# Patient Record
Sex: FEMALE | Race: WHITE | Employment: OTHER | URBAN - METROPOLITAN AREA
[De-identification: names, ages, dates, MRNs, and addresses within clinical notes are randomized per-mention and may not be internally consistent; named-entity substitution may affect disease eponyms.]

---

## 2017-03-10 ENCOUNTER — HOSPITAL ENCOUNTER (OUTPATIENT)
Age: 77
Discharge: HOME | End: 2017-03-10
Payer: MEDICARE

## 2017-03-10 ENCOUNTER — APPOINTMENT (OUTPATIENT)
Dept: GENERAL RADIOLOGY | Age: 77
End: 2017-03-10
Attending: PHYSICIAN ASSISTANT
Payer: MEDICARE

## 2017-03-10 VITALS
BODY MASS INDEX: 23.05 KG/M2 | DIASTOLIC BLOOD PRESSURE: 86 MMHG | HEART RATE: 78 BPM | SYSTOLIC BLOOD PRESSURE: 142 MMHG | TEMPERATURE: 98 F | HEIGHT: 64 IN | OXYGEN SATURATION: 99 % | WEIGHT: 135 LBS | RESPIRATION RATE: 20 BRPM

## 2017-03-10 DIAGNOSIS — S63.636A SPRAIN OF INTERPHALANGEAL JOINT OF RIGHT LITTLE FINGER, INITIAL ENCOUNTER: ICD-10-CM

## 2017-03-10 DIAGNOSIS — S01.111A EYEBROW LACERATION, RIGHT, INITIAL ENCOUNTER: Primary | ICD-10-CM

## 2017-03-10 PROCEDURE — 99213 OFFICE O/P EST LOW 20 MIN: CPT

## 2017-03-10 PROCEDURE — 12011 RPR F/E/E/N/L/M 2.5 CM/<: CPT

## 2017-03-10 PROCEDURE — 29130 APPL FINGER SPLINT STATIC: CPT

## 2017-03-10 RX ORDER — CLOPIDOGREL BISULFATE 75 MG/1
75 TABLET ORAL DAILY
COMMUNITY
End: 2018-07-27

## 2017-03-10 RX ORDER — LISINOPRIL 20 MG/1
20 TABLET ORAL DAILY
COMMUNITY
End: 2018-07-27

## 2017-03-10 RX ORDER — PANTOPRAZOLE SODIUM 40 MG/1
40 TABLET, DELAYED RELEASE ORAL
COMMUNITY
End: 2021-04-29

## 2017-03-10 NOTE — ED INITIAL ASSESSMENT (HPI)
10 am today - jumped up from chair, turned to right to get away from a bug on a table. Her feet did not separate, became tangled and caused her to fall bumping right eyebrow on table and injuring right 4th finger. Ecchymosis and pain especially DIP.   Smal

## 2017-03-10 NOTE — ED PROVIDER NOTES
Patient Seen in: [facility]    History   Patient presents with:  Laceration Abrasion (integumentary)  Finger Injury    Stated Complaint: LAC OVER RT EYE/FELL AT HOME    HPI    Patient is a pleasant 50-year-old female.   Patient i COLONOSCOPY  2010, 2012    MAMMOGRAM, SCREENING, BILATERAL (CPT=77057)  5/1/2013    HEMORRHOIDECTOMY  2011    TONSILLECTOMY  1949    D & C  1970    [facility] [place] INCISION AND DRAINAGE PERIRECTAL ABSCESS  6/6/2011    UPPER GI ENDOSCOPY,BIOPSY  1/31/2013    COLONOSC Conjunctiva WNL. No obvious discharge or crusting  ENT: No dyer sign, raccoon sign or hemotympanum  Neck: Supple, No appreciable Lymphadenopathy or thyromegaly, mass or swelling. No cervical point tenderness.    Lymph: No axillary, cervical, supraclavicul Patient's visiting home nurse was also informed to watch for changes. At this time, she has no complaints and her cranial nerves are intact. A short finger splint was applied to the right ring finger. Wear as needed.   Primary care follow-up in 5-7 days

## 2017-03-16 ENCOUNTER — HOSPITAL ENCOUNTER (OUTPATIENT)
Age: 77
Discharge: HOME | End: 2017-03-16
Payer: MEDICARE

## 2017-03-16 ENCOUNTER — APPOINTMENT (OUTPATIENT)
Dept: GENERAL RADIOLOGY | Age: 77
End: 2017-03-16
Attending: PHYSICIAN ASSISTANT
Payer: MEDICARE

## 2017-03-16 VITALS
TEMPERATURE: 98 F | DIASTOLIC BLOOD PRESSURE: 80 MMHG | RESPIRATION RATE: 16 BRPM | HEART RATE: 73 BPM | OXYGEN SATURATION: 100 % | SYSTOLIC BLOOD PRESSURE: 151 MMHG

## 2017-03-16 DIAGNOSIS — Z48.02 ENCOUNTER FOR REMOVAL OF SUTURES: Primary | ICD-10-CM

## 2017-03-16 DIAGNOSIS — S63.634D SPRAIN OF INTERPHALANGEAL JOINT OF RIGHT RING FINGER, SUBSEQUENT ENCOUNTER: ICD-10-CM

## 2017-03-16 PROCEDURE — 99213 OFFICE O/P EST LOW 20 MIN: CPT

## 2017-03-16 PROCEDURE — 73140 X-RAY EXAM OF FINGER(S): CPT

## 2017-03-16 NOTE — ED PROVIDER NOTES
Patient Seen in: [facility]    History   Patient presents with:  Upper Extremity Injury (musculoskeletal): right 4 th finger   Sut Stap RingRemoval (ingtegumentary): right eye     Stated Complaint: suture removal    HPI    CHIEF • Mitral valve prolapse    • Rectal bleeding      w/bowel movements   • Rectal polyp 10/19/2010     on colonoscopy-tubular adenoma   • Stroke [facility])            Past Surgical History    UPPER GI ENDOSCOPY,BIOPSY  1/13/13    Comment performed by Dr Loraw/CARLA Current:/80 mmHg  Pulse 73  Temp(Src) 97.6 °F (36.4 °C) (Oral)  Resp 16  SpO2 100%        Physical Exam   Constitutional: She is oriented to person, place, and time. She appears well-developed and well-nourished.    HENT:   Head:       Musculoskeletal Discharge    Follow-up:  Mike Zheng, [address] [facility] [phone]    In 1 week  For reevaluation      Medications Prescribed:  Current Discharge Medication List

## 2017-03-16 NOTE — ED INITIAL ASSESSMENT (HPI)
Patient presents to the immediate care for suture removal of sutures placed at the immediate care last Friday and a recheck of pain to her right 4th finger post fall. Patient states that last Friday she fell and evaluated at the immediate care.  Patient brett

## 2017-03-16 NOTE — ED NOTES
Received report. Spoke with pt. Aware we are waiting for xray read. Pt eager to leave has care giver with her who needs to leave.

## 2017-03-20 ENCOUNTER — TELEPHONE (OUTPATIENT)
Dept: INTERNAL MEDICINE CLINIC | Facility: CLINIC | Age: 77
End: 2017-03-20

## 2017-03-20 NOTE — TELEPHONE ENCOUNTER
Pt seen in immediate care and asked to follow up with us. There is already a pcp on file. Please reach out to pt to see if she intends to follow up at our clinic. If so please schedule appt.

## 2017-07-20 ENCOUNTER — OFFICE VISIT (OUTPATIENT)
Dept: SURGERY | Facility: CLINIC | Age: 77
End: 2017-07-20

## 2017-07-20 VITALS
SYSTOLIC BLOOD PRESSURE: 153 MMHG | BODY MASS INDEX: 21 KG/M2 | WEIGHT: 123 LBS | DIASTOLIC BLOOD PRESSURE: 77 MMHG | HEART RATE: 78 BPM

## 2017-07-20 DIAGNOSIS — K57.30 DIVERTICULOSIS OF LARGE INTESTINE WITHOUT HEMORRHAGE: ICD-10-CM

## 2017-07-20 DIAGNOSIS — D12.9 BENIGN NEOPLASM OF RECTUM AND ANAL CANAL: ICD-10-CM

## 2017-07-20 DIAGNOSIS — D12.8 BENIGN NEOPLASM OF RECTUM AND ANAL CANAL: ICD-10-CM

## 2017-07-20 DIAGNOSIS — D37.5 VILLOUS ADENOMA OF RECTUM: Primary | ICD-10-CM

## 2017-07-20 DIAGNOSIS — K63.5 BENIGN COLON POLYP: ICD-10-CM

## 2017-07-20 DIAGNOSIS — K64.8 HEMORRHOIDS, INTERNAL, WITH BLEEDING: ICD-10-CM

## 2017-07-20 DIAGNOSIS — K59.02 CONSTIPATION, OUTLET DYSFUNCTION: ICD-10-CM

## 2017-07-20 PROCEDURE — 99213 OFFICE O/P EST LOW 20 MIN: CPT | Performed by: COLON & RECTAL SURGERY

## 2017-07-20 PROCEDURE — 46600 DIAGNOSTIC ANOSCOPY SPX: CPT | Performed by: COLON & RECTAL SURGERY

## 2017-07-20 RX ORDER — DOCUSATE SODIUM 100 MG/1
100 CAPSULE, LIQUID FILLED ORAL 2 TIMES DAILY
COMMUNITY

## 2017-07-20 RX ORDER — QUETIAPINE 50 MG/1
TABLET, FILM COATED ORAL
Refills: 1 | COMMUNITY
Start: 2017-07-07

## 2017-07-20 NOTE — PROGRESS NOTES
Follow Up Visit Note       Active Problems      1. Villous adenoma of rectum    2. Benign colon polyp    3. Benign neoplasm of rectum and anal canal    4. Constipation, outlet dysfunction    5. Diverticulosis of large intestine without hemorrhage    6.  Hem female    • Stroke [facility])      Past Surgical History:  2010, 2012: COLONOSCOPY  No date: COLONOSCOPY  1970: D & C  6/6/2011: [facility] INCISION AND DRAINAGE PERIRECTAL ABSCESS  2011: HEMORRHOIDECTOMY  5/1/2013: MAMMOGRAM, SCREENING, BILATERAL (CPT=77057)  1949: diaphoresis, fatigue, fever and unexpected weight change. HENT: Negative for hearing loss, nosebleeds, sore throat and trouble swallowing. Respiratory: Negative for apnea, cough, shortness of breath and wheezing.     Cardiovascular: Negative for chest note and vitals reviewed.     Assessment     Assessment   Villous adenoma of rectum  (primary encounter diagnosis)  Benign colon polyp  Benign neoplasm of rectum and anal canal  Constipation, outlet dysfunction  Diverticulosis of large intestine without hem large left lateral internal hemorrhoid will require no therapy. No orders of the defined types were placed in this encounter. Imaging & Referrals   None    Follow Up  Return in about 1 year (around 7/20/2018).     Hernan Steele MD

## 2017-07-28 NOTE — PROCEDURES
Procedure:  Anoscopy    Surgeon: Rinku Mazariegos    Anesthesia: None    Findings: See the progress note attached for all findings    Operative Summary: The patient was placed in a prone position on the proctoscopy table, the hips were flexed in the jackknife p

## 2017-07-28 NOTE — PATIENT INSTRUCTIONS
This patient presents for further care treatment of her villous adenoma of the rectum. She also has constipation by outlet obstruction. The villous tumor of the rectum had definitive resection performed on October 20, 2010.     The patient feels a smal

## 2017-08-29 ENCOUNTER — OFFICE VISIT (OUTPATIENT)
Dept: SURGERY | Facility: CLINIC | Age: 77
End: 2017-08-29

## 2017-08-29 VITALS
DIASTOLIC BLOOD PRESSURE: 80 MMHG | HEIGHT: 64 IN | BODY MASS INDEX: 21 KG/M2 | SYSTOLIC BLOOD PRESSURE: 130 MMHG | WEIGHT: 123 LBS | TEMPERATURE: 98 F

## 2017-08-29 DIAGNOSIS — D37.5 VILLOUS ADENOMA OF RECTUM: ICD-10-CM

## 2017-08-29 DIAGNOSIS — K59.02 CONSTIPATION, OUTLET DYSFUNCTION: Primary | ICD-10-CM

## 2017-08-29 PROCEDURE — 99213 OFFICE O/P EST LOW 20 MIN: CPT | Performed by: COLON & RECTAL SURGERY

## 2017-08-29 NOTE — PROGRESS NOTES
Follow Up Visit Note       Active Problems      1. Constipation, outlet dysfunction    2. Villous adenoma of rectum          Chief Complaint   Patient presents with:  Anal Problem (GI): Constipation being treated with miralax, colace and benefiber. Has a lo current treatment team.  I did tell her that taking one Seroquel and 2 Xanax at the same time could cause her to be extremely drowsy and lethargic. She notes an incident where she was [place] a zombie\".         My total face time with this patient was 39 m Mother    • Cancer Brother      Social History    Marital status:              Spouse name:                       Years of education:                 Number of children:               Social History Main Topics    Smoking status: Never Smoker Skin: Negative for color change and rash. Neurological: Negative for tremors, syncope and weakness. Hematological: Negative for adenopathy. Does not bruise/bleed easily.    Psychiatric/Behavioral: Negative for behavioral problems and sleep disturbance umbilical hernias present. The liver and spleen are not palpable. She has no guarding or rebound. I have asked her to maintain the above listed schedule. She asked me several questions at this visit.     I told her that gas is normal, and can be ant

## 2017-08-29 NOTE — PATIENT INSTRUCTIONS
I am seeing this patient in further consultation regarding irregular bowel habits and constipation. The patient states she is greatly improved from her last visit.     At her last visit we discussed the fact that her recent stroke, and current medication and Xanax. I have deferred any decision making to her current treatment team.  I did tell her that taking one Seroquel and 2 Xanax at the same time could cause her to be extremely drowsy and lethargic.   She notes an incident where [facility] [place] [facility]

## 2017-10-30 ENCOUNTER — TELEPHONE (OUTPATIENT)
Dept: SURGERY | Facility: CLINIC | Age: 77
End: 2017-10-30

## 2017-10-30 NOTE — TELEPHONE ENCOUNTER
Pt seen in Aug and put on a bowel regime of miralax, colace, & benefiber. Pt has been doing well and then today had 5 BM and there was some blood.  Pt also saw another doctor and they told her that her constipation is due to her stroke although these meds h

## 2018-02-13 ENCOUNTER — TELEPHONE (OUTPATIENT)
Dept: SURGERY | Facility: CLINIC | Age: 78
End: 2018-02-13

## 2018-02-13 NOTE — TELEPHONE ENCOUNTER
Patient expressed concern that when she has a bowel movement 1-2 times per day she passes urine. This started approx 6 months ago. Patient is following the same bowel regime that Dr. Letty Mckenna ordered.  She has an appointment with Dr. Letty Mckenna in August for foll

## 2018-07-24 ENCOUNTER — OFFICE VISIT (OUTPATIENT)
Dept: SURGERY | Facility: CLINIC | Age: 78
End: 2018-07-24
Payer: MEDICARE

## 2018-07-24 VITALS
BODY MASS INDEX: 21 KG/M2 | DIASTOLIC BLOOD PRESSURE: 76 MMHG | WEIGHT: 123 LBS | HEART RATE: 75 BPM | SYSTOLIC BLOOD PRESSURE: 144 MMHG | TEMPERATURE: 98 F

## 2018-07-24 DIAGNOSIS — D37.5 VILLOUS ADENOMA OF RECTUM: Primary | ICD-10-CM

## 2018-07-24 DIAGNOSIS — N81.6 RECTOCELE: ICD-10-CM

## 2018-07-24 DIAGNOSIS — F41.9 ANXIETY: ICD-10-CM

## 2018-07-24 DIAGNOSIS — K59.02 CONSTIPATION, OUTLET DYSFUNCTION: ICD-10-CM

## 2018-07-24 DIAGNOSIS — K58.1 IRRITABLE BOWEL SYNDROME WITH CONSTIPATION: ICD-10-CM

## 2018-07-24 PROCEDURE — 46600 DIAGNOSTIC ANOSCOPY SPX: CPT | Performed by: COLON & RECTAL SURGERY

## 2018-07-24 PROCEDURE — 99214 OFFICE O/P EST MOD 30 MIN: CPT | Performed by: COLON & RECTAL SURGERY

## 2018-07-24 NOTE — PROGRESS NOTES
Follow Up Visit Note       Active Problems      1. Villous adenoma of rectum    2. Irritable bowel syndrome with constipation    3. Rectocele    4. Constipation, outlet dysfunction    5.  Anxiety          Chief Complaint   Patient presents with:  Anal Probl as scribed by the PA  I performed my own physical exam and obtained the history as detailed above.   I have made all appropriate changes in documentation as necessary  I attest to the accuracy of this note as detailed above    Esperanza Lynn MD FACS [facility] education:                 Number of children:               Social History Main Topics    Smoking status: Never Smoker                                                                Alcohol use: Yes                Comment: social    Drug use:  No myalgias. Skin: Negative for color change and rash. Neurological: Negative for tremors, syncope and weakness. Hematological: Negative for adenopathy. Does not bruise/bleed easily.    Psychiatric/Behavioral: Negative for behavioral problems and sleep d dysfunction  Anxiety    Plan   This patient presents for continued evaluation and treatment regarding irregular bowel habits and constipation. The patient also has a history of a villous adenoma of the rectum.     The patient states that she is doing well office visit discussed with the patient and her granddaughter that I would recommend she continue her current bowel regimen with very mild changes. We will have her take her Benefiber once daily at 9 AM in the morning.   She should continue the MiraLAX onc

## 2018-07-25 NOTE — PATIENT INSTRUCTIONS
This patient presents for continued evaluation and treatment regarding irregular bowel habits and constipation. The patient also has a history of a villous adenoma of the rectum. The patient states that she is doing well following her last visit.  Linda the patient and her granddaughter that I would recommend she continue her current bowel regimen with very mild changes. We will have her take her Benefiber once daily at 9 AM in the morning. She should continue the MiraLAX once daily at noon.   She should

## 2018-07-26 ENCOUNTER — TELEPHONE (OUTPATIENT)
Dept: SURGERY | Facility: CLINIC | Age: 78
End: 2018-07-26

## 2018-07-26 NOTE — TELEPHONE ENCOUNTER
Pt called wanting to ask [facility] a f/u question about her mirilax and benefiber she takes daily. She wanted to know if these meds would cause any urinary problems. I asked [facility] the question and I called pt back.  In formed pt that Ad Evans states these

## 2018-07-27 NOTE — PROCEDURES
Procedure:  Anoscopy    Surgeon: Adelita Valdez    Anesthesia: None    Findings: See the progress note attached for all findings    Operative Summary: The patient was placed in a prone position on the proctoscopy table, the hips were flexed in the jackknife p

## 2023-10-26 ENCOUNTER — HOSPITAL ENCOUNTER (OUTPATIENT)
Dept: GENERAL RADIOLOGY | Age: 83
Discharge: HOME | End: 2023-10-26
Attending: EMERGENCY MEDICINE

## 2023-10-26 ENCOUNTER — WALK IN (OUTPATIENT)
Dept: URGENT CARE | Age: 83
End: 2023-10-26
Attending: EMERGENCY MEDICINE

## 2023-10-26 VITALS
SYSTOLIC BLOOD PRESSURE: 144 MMHG | BODY MASS INDEX: 27.46 KG/M2 | TEMPERATURE: 98.1 F | DIASTOLIC BLOOD PRESSURE: 78 MMHG | HEART RATE: 67 BPM | OXYGEN SATURATION: 97 % | WEIGHT: 160 LBS | RESPIRATION RATE: 16 BRPM

## 2023-10-26 DIAGNOSIS — S09.90XA CLOSED HEAD INJURY WITHOUT LOSS OF CONSCIOUSNESS, INITIAL ENCOUNTER: ICD-10-CM

## 2023-10-26 DIAGNOSIS — M25.532 LEFT WRIST PAIN: ICD-10-CM

## 2023-10-26 DIAGNOSIS — S63.502A LEFT WRIST SPRAIN, INITIAL ENCOUNTER: Primary | ICD-10-CM

## 2023-10-26 PROCEDURE — 73110 X-RAY EXAM OF WRIST: CPT

## 2023-10-26 PROCEDURE — 99202 OFFICE O/P NEW SF 15 MIN: CPT

## 2023-10-26 RX ORDER — LISINOPRIL 20 MG/1
20 TABLET ORAL DAILY
COMMUNITY
Start: 2023-10-14

## 2023-10-26 RX ORDER — QUETIAPINE FUMARATE 25 MG/1
25 TABLET, FILM COATED ORAL EVERY EVENING
COMMUNITY
Start: 2023-10-20

## 2023-10-26 RX ORDER — VENLAFAXINE HYDROCHLORIDE 150 MG/1
150 CAPSULE, EXTENDED RELEASE ORAL DAILY
COMMUNITY
Start: 2023-10-14

## 2023-10-26 RX ORDER — ATORVASTATIN CALCIUM 10 MG/1
10 TABLET, FILM COATED ORAL DAILY
COMMUNITY
Start: 2023-10-14

## 2023-10-26 RX ORDER — ATORVASTATIN CALCIUM 20 MG/1
20 TABLET, FILM COATED ORAL DAILY
COMMUNITY
Start: 2023-09-15 | End: 2023-10-26 | Stop reason: DRUGHIGH

## 2023-10-26 RX ORDER — CLOPIDOGREL BISULFATE 75 MG/1
75 TABLET ORAL DAILY
COMMUNITY
Start: 2023-10-14

## 2023-10-26 ASSESSMENT — PAIN SCALES - GENERAL
PAINLEVEL_OUTOF10: 5
PAINLEVEL: 5

## 2024-03-19 ENCOUNTER — APPOINTMENT (OUTPATIENT)
Dept: URBAN - METROPOLITAN AREA CLINIC 315 | Age: 84
Setting detail: DERMATOLOGY
End: 2024-03-19

## 2024-03-19 DIAGNOSIS — L82.1 OTHER SEBORRHEIC KERATOSIS: ICD-10-CM

## 2024-03-19 DIAGNOSIS — L738 OTHER SPECIFIED DISEASES OF HAIR AND HAIR FOLLICLES: ICD-10-CM

## 2024-03-19 DIAGNOSIS — L85.3 XEROSIS CUTIS: ICD-10-CM

## 2024-03-19 DIAGNOSIS — L91.8 OTHER HYPERTROPHIC DISORDERS OF THE SKIN: ICD-10-CM

## 2024-03-19 DIAGNOSIS — L663 OTHER SPECIFIED DISEASES OF HAIR AND HAIR FOLLICLES: ICD-10-CM

## 2024-03-19 PROBLEM — L02.02 FURUNCLE OF FACE: Status: ACTIVE | Noted: 2024-03-19

## 2024-03-19 PROCEDURE — OTHER PRESCRIPTION MEDICATION MANAGEMENT: OTHER

## 2024-03-19 PROCEDURE — OTHER COUNSELING: OTHER

## 2024-03-19 PROCEDURE — OTHER OTC TREATMENT REGIMEN: OTHER

## 2024-03-19 PROCEDURE — OTHER PRESCRIPTION: OTHER

## 2024-03-19 PROCEDURE — 99203 OFFICE O/P NEW LOW 30 MIN: CPT

## 2024-03-19 PROCEDURE — OTHER MIPS QUALITY: OTHER

## 2024-03-19 RX ORDER — CLINDAMYCIN PHOSPHATE 10 MG/ML
LOTION TOPICAL
Qty: 60 | Refills: 0 | Status: CANCELLED | COMMUNITY
Start: 2024-03-19

## 2024-03-19 ASSESSMENT — LOCATION SIMPLE DESCRIPTION DERM
LOCATION SIMPLE: LEFT PRETIBIAL REGION
LOCATION SIMPLE: RIGHT ANTERIOR NECK
LOCATION SIMPLE: RIGHT PRETIBIAL REGION
LOCATION SIMPLE: LEFT CHEEK

## 2024-03-19 ASSESSMENT — LOCATION ZONE DERM
LOCATION ZONE: FACE
LOCATION ZONE: LEG
LOCATION ZONE: NECK

## 2024-03-19 ASSESSMENT — LOCATION DETAILED DESCRIPTION DERM
LOCATION DETAILED: LEFT CENTRAL MALAR CHEEK
LOCATION DETAILED: LEFT DISTAL PRETIBIAL REGION
LOCATION DETAILED: RIGHT PROXIMAL PRETIBIAL REGION
LOCATION DETAILED: RIGHT INFERIOR LATERAL NECK
LOCATION DETAILED: RIGHT DISTAL PRETIBIAL REGION
LOCATION DETAILED: RIGHT CLAVICULAR NECK

## 2024-03-19 NOTE — HPI: RASH
What Type Of Note Output Would You Prefer (Optional)?: Bullet Format
How Severe Is Your Rash?: moderate
Is This A New Presentation, Or A Follow-Up?: Rash
Additional History: Patient can feel bumps on face that she will pick. Patient will look in a magnifying mirror. Wants reassurance.

## 2024-03-19 NOTE — PROCEDURE: OTC TREATMENT REGIMEN
Detail Level: Zone
Patient Specific Otc Recommendations (Will Not Stick From Patient To Patient): CeraVe or Cetaphil lotion QD- BID

## 2024-03-19 NOTE — PROCEDURE: MIPS QUALITY
Quality 137: Melanoma: Continuity Of Care - Recall System: Recall system not utilized, reason not otherwise specified
Quality 176: Tuberculosis Screening Prior To First Course Of Biologic And/Or Immune Response Modifier Therapy: Patient receiving first-time biologic and/or immune response modifier therapy, TB Screening Performed and Results Interpreted within 12 months
Detail Level: Detailed
Quality 226: Preventive Care And Screening: Tobacco Use: Screening And Cessation Intervention: Patient screened for tobacco use and is an ex/non-smoker
Quality 47: Advance Care Plan: Advance care planning not documented, reason not otherwise specified.
Quality 485: Psoriasis - Improvement In Patient-Reported Itch Severity: Itch severity assessment score is reduced by 2 or more points from the initial (index) assessment score to the follow-up visit score
Quality 358: Patient-Centered Surgical Risk Assessment And Communication: Documentation of patient-specific risk assessment with a risk calculator based on multi-institutional clinical data, the specific risk calculator used, and communication of risk assessment from risk calculator with the patient or family.
Quality 410: Psoriasis Clinical Response To Oral Systemic Or Biologic Medications: Patient has been on biologic or system therapy for less than 6 months

## (undated) NOTE — ED AVS SNAPSHOT
[facility] in [address] [place] [address] Drive,[address] Floor    [address] [place] [address]    Phone:  [phone]    Fax:  [phone] [facility]   MRN: [id number]    Department:  [facility] in [facility] [place]   Date of Visit:  3/16/2017 To Check ER Wait Times:  TEXT 'ERwait' to 03877      Click [email or web address]      Or call [phone]    If you have any problems with your follow-up, please call our  at [phone].     Si usted tiene algun problema con babb sequimiento, por I have read and understand the instructions given to me by my caregivers. 24-Hour Pharmacies        Pharmacy Address Phone Number   [facility] [address] ([address]) M[place]o Lopez Mild arthritic changes.          Dictated by: Antonietta Arreola MD on 3/16/2017 at 13:13       Approved by: Antonietta Arreola MD              Narrative:    PROCEDURE:  XR FINGER(S) (MIN 2 VIEWS), RIGHT 4TH (CPT=73140)     INDICATIONS:  suture removal     CO

## (undated) NOTE — LETTER
17    Patient: Mary Carias  : 1940 Visit date: 2017    Dear  Dr. Izaiah Martínez MD,    Thank you for referring Mary Carias to my practice. Please find my assessment and plan below.                Assessment   Constipation, outlet dysfunc periods of rescue. She should only take the suppository on the third day of no bowel movement. She has me about Advil versus Tylenol. She should avoid Advil use as this can lead to ulcer disease and other problems.   If she can get by with regular stre

## (undated) NOTE — LETTER
18    Patient: Jin Lang  : 1940 Visit date: 2018    Dear  Dr. Migel Lozano MD,    Thank you for referring Jin Lang to my practice. Please find my assessment and plan below.       Assessment   Villous adenoma of rectum  (primary encou abdominal masses. There are no hernias appreciated. Examination of the perineum reveals a small scar to the right lateral position. Digital rectal exam elicits a very small amount of pain. There are no palpable mass abnormalities.   Anoscopy does not de

## (undated) NOTE — ED AVS SNAPSHOT
[facility] in [address] [place] [address] Drive,[address]Th Floor    [address] [place] [address]    Phone:  [phone]    Fax:  [phone] [facility]   MRN: [id number]    Department:  [facility] Care in [facility] END   Date of Visit:  3/10/2017 [facility]  [address] [facility] [address]  [address], [address]  [phone] [address]  [address]  [address] [facility]  [address] [facility]  [phone] [facility] Care  [address] [facility] [address] Adam Skinner 1   [phone] re-interpreted by a radiologist.  If there is a significant change in your reading, you will be contacted. Please make sure we have your correct phone number before you leave. After you leave, you should follow the attached instructions.      I have read an and ask to get set up for an insurance coverage that is in-network with Naren Lobato.         [employer]     Call the [employer] for assistance with your inactive [employer] account    If you have questions, you can call [phone] to talk to our [facility]

## (undated) NOTE — LETTER
17    Patient: Jin Lang  : 1940 Visit date: 2017    Dear  Dr. Jessica Soto,    Thank you for referring Jin Lang to my practice. Please find my assessment and plan below.       This patient presents for further care treatment of her v CC: No Recipients